# Patient Record
(demographics unavailable — no encounter records)

---

## 2024-11-04 NOTE — REVIEW OF SYSTEMS
[Negative] : Heme/Lymph [Joint Pain] : no joint pain [Joint Stiffness] : no joint stiffness [FreeTextEntry9] : Right knee

## 2024-11-04 NOTE — PHYSICAL EXAM
[Antalgic] : not antalgic [de-identified] : GENERAL APPEARANCE: Well nourished and hydrated, pleasant, alert, and oriented x 3. Appears their stated age.  HEENT: Normocephalic, extraocular eye motion intact. Nasal septum midline. Oral cavity clear. External auditory canal clear.  RESPIRATORY: Breath sounds clear and audible in all lobes. No wheezing, No accessory muscle use. CARDIOVASCULAR: No apparent abnormalities. No lower leg edema. No varicosities. Pedal pulses are palpable. NEUROLOGIC: Sensation is normal, no muscle weakness in the upper or lower extremities. DERMATOLOGIC: No apparent skin lesions, moist, warm, no rash. SPINE: Cervical spine appears normal and moves freely; thoracic spine appears normal and moves freely; lumbosacral spine appears normal and moves freely, normal, nontender. MUSCULOSKELETAL: Hands, wrists, and elbows are normal and move freely, shoulders are normal and move freely.  Musculoskeletal 5/5 motor strength in bilateral lower extremities. Sensory: Intact in bilateral lower extremities. DTRs: Biceps, brachioradialis, triceps, patellar, ankle and plantar 2+ and symmetric bilaterally. Pulses: dorsalis pedis, posterior tibial, femoral, popliteal, and radial 2+ and symmetric bilaterally.  Constitutional: Alert and in no acute distress, but well-appearing.   [de-identified] : Right knee examination shows mild varus alignment range of motion is 0 to 130 degree degree without pain

## 2024-11-04 NOTE — DISCUSSION/SUMMARY
[de-identified] : This is a 62-year-old male with moderate medial compartment osteoarthritis with varus alignment. the right knee MRI shows medial complex meniscus tear with small flap But he has moderate to severe cartilage loss in the medial compartment.I recommended stay with conservative management he is getting 2-month of relief from cortisone injection. He is walking without using walking assistive device.  He denies any pain today .  States physical therapy has been greatly helpful I recommended continue with another round of physical therapy.  patient is on Plavix .  Therefore I recommend Tylenol for pain management if needed in the future . he had good outcome from left knee arthroscopy 10 to 15 years ago , but at this time he has moderate arthritic change therefore pain relief from arthroscopy is a bit unpredictable.  Will see him back in 3 months for repeat evaluation

## 2024-11-04 NOTE — HISTORY OF PRESENT ILLNESS
[Pain Location] : pain [0] : a current pain level of 0/10 [None] : No exacerbating factors are noted [de-identified] : This is a 62-year-old male presents back to office for follow-up of right knee pain he had severe pain 3 months ago she had cortisone injection which provide some relief he had MRI which showed full-thickness cartilage loss in the medial compartment as well as meniscus tear with a small flap tissue we recommend him to begin physical therapy because he felt that the pain relief from arthroscopy to treat torn meniscus was unpredictable today he denies any pain he is continuing with physical therapy he is working in the [facility] he is walking without using walking assistive device he is no longer limping he is happy with his progression

## 2025-02-06 NOTE — DISCUSSION/SUMMARY
[Patient] : the patient [Risks] : risks [Benefits] : benefits [Alternatives] : alternatives [With Me] : with me [___ Month(s)] : in [unfilled] month(s) [EKG obtained to assist in diagnosis and management of assessed problem(s)] : EKG obtained to assist in diagnosis and management of assessed problem(s) [FreeTextEntry1] : 62 M with HTN, HLD, knee repair with provoked PE, coronary artery disease and s/p PCI     1)  coronary artery disease : s/p PCI./   stop aspirin.  ct plavix.    there si some moderate sntoesis inother artereis. aggressive LDL    stress test  LDL goal. < 50   2 HTN: controlled amlodipdine  10 mg and  off   chlorthalidoe 25 mg daily  due to hypokelmia.    ARb valsartan 40 mg.  3) Mildly dilated aorta:  repeat transthoracic echocardiogram  4) dyslipidemia :   zocor recently.    crestor 20 mg  6)Will order and review ECG for the above mentioned diagnosis/condition/symptoms

## 2025-02-06 NOTE — CARDIOLOGY SUMMARY
[No Ischemia] : no Ischemia [No Exercise Ind Arr] : no exercise induced arrhythmias [No Symptoms] : no Symptoms [___] : [unfilled] [LVEF ___%] : LVEF [unfilled]% [Normal] : normal LA size [None] : no mitral regurgitation [de-identified] : 2 6 2025L: Sinus Rhythm  WITHIN NORMAL LIMITS  8 13 2024: Sinus Rhythm  WITHIN NORMAL LIMITS 2 14 2024: sinus Sinus Rhythm  WITHIN NORMAL LIMITS  10 202 201  Sinus  Rhythm  WITHIN NORMAL LIMITS  [de-identified] : feb 2024:  Normal LVE.f  63%l  normal Rv funciton no significant valvular abnormality  nov 2020  Mild LVH. LVEF 61%.  normal Rv. midl AI. dialted Oarta 4.2  [de-identified] : cardiac cta:   dec 2023:  	  Coronary artery calcium score:311. 75th percentile. Moderate coronary calcification. Obstructive disease in LAD high risk vulnerable plaque in LAD.. Chronic total occlusion in PDA. Possible obstructive in RCA. Normal LV size and function. Recommend CT-FFR for further evaluation of the hemodynamic significance of the above RCA stenosis. CADSRAD 5/P3/HRP FFR:  mid PDA>    [de-identified] : feb 9,.2024:  Severe RCA disease.  PDA:  small caliberer. POBA done.    OM 2 50%.  OM1 : 70%.  RCA mid : 80% .  RPDA 100%   LAD 50% Prox.  LAD moderate disease.  normal iFR   MONI onyz 3.5 x 18 mm vesse.  [de-identified] : jan 2024:  LDL: 50.  HDL: 37.  Total: 113.  Tgs: 150   normal liporptine level. < 50

## 2025-02-06 NOTE — HISTORY OF PRESENT ILLNESS
[FreeTextEntry1] : pulmonary embolism   HPI for today:    2025:   he has been feelign ok.  last time his potassium was low.  so we gave him supllemnt.  adn stopped the choorithalidone. his BP is ocntrolled. no diziznes.s no syncope no chest pain    old note:  2024:  he was found to have abnoprmnal cardiac cta s/p PCI   ol dnote:  no chest pain . no dyspnea on exertion . no dizziness. no syncope. coplaitn with meds. ECG : Sinus Rhythm  WITHIN NORMAL LIMITS  oldfeels good. no chest pain . no diziznes.s no syncope, no [id number] compliant with meds. :   old noteL: feels good. compliatn with meds.  no headhcaes. no dizizness. nosyncope he did not toelrate 10 mg . of norvas. he walks every day 40 mins    old note:  feels good. no chest pain. noheadaches. no dizziness. compliatn with meds./  diet and exercise.   old note: : feels good. no chest pain. no haedaches./ no dizzienss. no dyspnea.  aspirin 81 and hypertension meds. an  old note: c/o LE edema.   Stands for a long time. No headache. No dizziness. NO syncope.   old note: Feels good now. No chest pain. No dyspnea. No leg pain.  No dyspnea. No swelling.  Ffather  of heart attakc.    Old note:  This is a 53 M with history of hypertension, knee arhtritis/meniscal injury on bed rest s/p knee surgery was admitted in the hospital for chest pain. Found to have acute pulmonary embolism. no DVT in legs. Patient had a negative coagulopathic work up. Patient was discharged on xarelto 15 BID for 21 days and then 20 daily,.  Patient does have intermittent chest pain. improved significantly. family history of premature CAD.